# Patient Record
Sex: MALE | URBAN - METROPOLITAN AREA
[De-identification: names, ages, dates, MRNs, and addresses within clinical notes are randomized per-mention and may not be internally consistent; named-entity substitution may affect disease eponyms.]

---

## 2024-10-22 ENCOUNTER — OFFICE VISIT (OUTPATIENT)
Dept: URBAN - METROPOLITAN AREA TELEHEALTH 2 | Facility: TELEHEALTH | Age: 69
End: 2024-10-22
Payer: COMMERCIAL

## 2024-10-22 VITALS — HEIGHT: 71 IN | WEIGHT: 244 LBS | BODY MASS INDEX: 34.16 KG/M2

## 2024-10-22 DIAGNOSIS — G47.33 OSA ON CPAP: ICD-10-CM

## 2024-10-22 DIAGNOSIS — Z79.899 MEDICATION MANAGEMENT: ICD-10-CM

## 2024-10-22 DIAGNOSIS — Z12.11 COLON CANCER SCREENING: ICD-10-CM

## 2024-10-22 PROBLEM — 305058001: Status: ACTIVE | Noted: 2024-10-22

## 2024-10-22 PROBLEM — 78275009: Status: ACTIVE | Noted: 2024-10-22

## 2024-10-22 PROCEDURE — 99204 OFFICE O/P NEW MOD 45 MIN: CPT | Performed by: INTERNAL MEDICINE

## 2024-10-22 RX ORDER — RAMIPRIL 5 MG/1
CAPSULE ORAL
Qty: 90 CAPSULE | Status: ACTIVE | COMMUNITY

## 2024-10-22 RX ORDER — METFORMIN ER 500 MG 500 MG/1
TAKE 2 TABLET BY MOUTH TWICE DAILY FOR 90 DAYS TABLET ORAL
Qty: 360 EACH | Refills: 0 | Status: ACTIVE | COMMUNITY

## 2024-10-22 RX ORDER — ERGOCALCIFEROL CAPSULES, 1.25 MG/1
TAKE 1 CAPSULE BY MOUTH 1 TIME PER MONTH CAPSULE ORAL
Qty: 3 EACH | Refills: 2 | Status: ACTIVE | COMMUNITY

## 2024-10-22 RX ORDER — FINASTERIDE 1 MG/1
TAKE 1 TABLET BY MOUTH EVERY DAY TABLET, FILM COATED ORAL
Qty: 90 EACH | Refills: 0 | Status: ACTIVE | COMMUNITY

## 2024-10-22 RX ORDER — TIRZEPATIDE 2.5 MG/.5ML
INJECTION, SOLUTION SUBCUTANEOUS
Qty: 2 MILLILITER | Status: ACTIVE | COMMUNITY

## 2024-10-22 RX ORDER — ATORVASTATIN CALCIUM 40 MG/1
TAKE 1 TABLET BY MOUTH EVERY NIGHT WITH DINNER TABLET ORAL
Qty: 90 EACH | Refills: 3 | Status: ACTIVE | COMMUNITY

## 2024-10-22 RX ORDER — ATENOLOL 50 MG/1
TAKE 1 TABLET BY MOUTH TWICE DAILY TABLET ORAL
Qty: 180 EACH | Refills: 0 | Status: ACTIVE | COMMUNITY

## 2024-10-22 NOTE — HPI-TODAY'S VISIT:
PMH of DMII and HTN Due for colonoscopy Has not taken much care lately due to family caretaker status.  Recent passing of mom, dad, and sister No change in bowel habits No blood in stools

## 2024-11-22 ENCOUNTER — OFFICE VISIT (OUTPATIENT)
Dept: URBAN - METROPOLITAN AREA MEDICAL CENTER 28 | Facility: MEDICAL CENTER | Age: 69
End: 2024-11-22

## 2024-11-22 ENCOUNTER — LAB OUTSIDE AN ENCOUNTER (OUTPATIENT)
Dept: URBAN - METROPOLITAN AREA CLINIC 98 | Facility: CLINIC | Age: 69
End: 2024-11-22

## 2024-11-22 LAB
GLUCOSE POC: 133
PERFORMING LAB: (no result)

## 2024-11-22 RX ORDER — ATENOLOL 50 MG/1
TAKE 1 TABLET BY MOUTH TWICE DAILY TABLET ORAL
Qty: 180 EACH | Refills: 0 | Status: ACTIVE | COMMUNITY

## 2024-11-22 RX ORDER — TIRZEPATIDE 2.5 MG/.5ML
INJECTION, SOLUTION SUBCUTANEOUS
Qty: 2 MILLILITER | Status: ACTIVE | COMMUNITY

## 2024-11-22 RX ORDER — RAMIPRIL 5 MG/1
CAPSULE ORAL
Qty: 90 CAPSULE | Status: ACTIVE | COMMUNITY

## 2024-11-22 RX ORDER — ATORVASTATIN CALCIUM 40 MG/1
TAKE 1 TABLET BY MOUTH EVERY NIGHT WITH DINNER TABLET ORAL
Qty: 90 EACH | Refills: 3 | Status: ACTIVE | COMMUNITY

## 2024-11-22 RX ORDER — ERGOCALCIFEROL CAPSULES, 1.25 MG/1
TAKE 1 CAPSULE BY MOUTH 1 TIME PER MONTH CAPSULE ORAL
Qty: 3 EACH | Refills: 2 | Status: ACTIVE | COMMUNITY

## 2024-11-22 RX ORDER — FINASTERIDE 1 MG/1
TAKE 1 TABLET BY MOUTH EVERY DAY TABLET, FILM COATED ORAL
Qty: 90 EACH | Refills: 0 | Status: ACTIVE | COMMUNITY

## 2024-11-22 RX ORDER — METFORMIN ER 500 MG 500 MG/1
TAKE 2 TABLET BY MOUTH TWICE DAILY FOR 90 DAYS TABLET ORAL
Qty: 360 EACH | Refills: 0 | Status: ACTIVE | COMMUNITY